# Patient Record
Sex: FEMALE | Race: WHITE | NOT HISPANIC OR LATINO | URBAN - METROPOLITAN AREA
[De-identification: names, ages, dates, MRNs, and addresses within clinical notes are randomized per-mention and may not be internally consistent; named-entity substitution may affect disease eponyms.]

---

## 2021-09-04 ENCOUNTER — OUT OF OFFICE VISIT (OUTPATIENT)
Dept: URBAN - METROPOLITAN AREA MEDICAL CENTER 34 | Facility: MEDICAL CENTER | Age: 71
End: 2021-09-04
Payer: MEDICARE

## 2021-09-04 DIAGNOSIS — U07.1 COVID: ICD-10-CM

## 2021-09-04 DIAGNOSIS — K52.89 OTHER AND UNSPECIFIED NONINFECTIOUS GASTROENTERITIS AND COLITIS: ICD-10-CM

## 2021-09-04 DIAGNOSIS — R93.3 ABN FINDINGS-GI TRACT: ICD-10-CM

## 2021-09-04 DIAGNOSIS — K62.5 ANAL BLEEDING: ICD-10-CM

## 2021-09-04 PROCEDURE — 99214 OFFICE O/P EST MOD 30 MIN: CPT | Performed by: INTERNAL MEDICINE

## 2021-09-04 PROCEDURE — 45380 COLONOSCOPY AND BIOPSY: CPT | Performed by: INTERNAL MEDICINE

## 2023-02-01 ENCOUNTER — TELEPHONE ENCOUNTER (OUTPATIENT)
Dept: URBAN - METROPOLITAN AREA CLINIC 94 | Facility: CLINIC | Age: 73
End: 2023-02-01

## 2023-02-07 ENCOUNTER — WEB ENCOUNTER (OUTPATIENT)
Dept: URBAN - METROPOLITAN AREA CLINIC 94 | Facility: CLINIC | Age: 73
End: 2023-02-07

## 2023-02-07 ENCOUNTER — OFFICE VISIT (OUTPATIENT)
Dept: URBAN - METROPOLITAN AREA CLINIC 94 | Facility: CLINIC | Age: 73
End: 2023-02-07
Payer: MEDICARE

## 2023-02-07 VITALS
SYSTOLIC BLOOD PRESSURE: 125 MMHG | BODY MASS INDEX: 24.54 KG/M2 | TEMPERATURE: 97.3 F | WEIGHT: 125 LBS | HEIGHT: 60 IN | HEART RATE: 90 BPM | DIASTOLIC BLOOD PRESSURE: 83 MMHG

## 2023-02-07 DIAGNOSIS — R19.7 DIARRHEA, UNSPECIFIED TYPE: ICD-10-CM

## 2023-02-07 PROCEDURE — 99214 OFFICE O/P EST MOD 30 MIN: CPT | Performed by: PHYSICIAN ASSISTANT

## 2023-02-07 RX ORDER — LAMOTRIGINE 200 MG/1
250 MG TABLET ORAL ONCE A DAY
Status: ACTIVE | COMMUNITY

## 2023-02-07 NOTE — HPI-TODAY'S VISIT:
73 yo F evaluated today for acute onset diarrhea.   Pt reports on January 6th - tx for UTI at [facility] ER - Given Rocephin followed by Cipro  Shortly afterwards - bowel habits changed with loose to soft stools with mucus.  Pt did report seeing BR blood- TP only  last Monday.  Denies fever. Sx have since improved and this morning, was the first BM that appeared normal. Pt also reports taking probiotics and yogurt.   Last CT 9/2022 - Findings most consistent with nonspecific enterocolitis.  Colonoscopy 2021 - DR Westbrook - Colitis, in descending colon, nonspecific, mild to moderate. Internal hemorrhoids. Minimal diverticulosis. Bx Colonic mucosa with fibrosis, intramucosal hemorrhage, focal active colitis, and crypt atrophy, see comment. The constellation of findings is most compatible with ischemic colitis; however, the differential diagnosis includes acute self-limited colitis, infection, and inflammatory bowel disease.  Colonoscopy 2020 DR Johnson -sigmoid colitis-ischemic versus self-limiting versus inflammatory bowel.  Diverticulosis, internal hemorrhoids.

## 2023-02-08 LAB
ABSOLUTE BASOPHILS: 41
ABSOLUTE EOSINOPHILS: 82
ABSOLUTE LYMPHOCYTES: 1190
ABSOLUTE MONOCYTES: 619
ABSOLUTE NEUTROPHILS: 4869
BASOPHILS: 0.6
C-REACTIVE PROTEIN, QUANT: 4.1
EOSINOPHILS: 1.2
HEMATOCRIT: 37.4
HEMOGLOBIN: 12.1
LYMPHOCYTES: 17.5
MCH: 27.3
MCHC: 32.4
MCV: 84.2
MONOCYTES: 9.1
MPV: 13.2
NEUTROPHILS: 71.6
PLATELET COUNT: 252
RDW: 12.8
RED BLOOD CELL COUNT: 4.44
SED RATE BY MODIFIED: 11
WHITE BLOOD CELL COUNT: 6.8

## 2023-02-14 LAB
CALPROTECTIN, FECAL: 6
CLOSTRIDIUM DIFFICILE TOXINB,QL REAL TIME PCR: NOT DETECTED
CLOSTRIDIUM DIFFICILE: (no result)

## 2023-02-20 ENCOUNTER — OFFICE VISIT (OUTPATIENT)
Dept: URBAN - METROPOLITAN AREA CLINIC 94 | Facility: CLINIC | Age: 73
End: 2023-02-20

## 2023-02-21 ENCOUNTER — WEB ENCOUNTER (OUTPATIENT)
Dept: URBAN - METROPOLITAN AREA CLINIC 94 | Facility: CLINIC | Age: 73
End: 2023-02-21

## 2023-02-21 ENCOUNTER — OFFICE VISIT (OUTPATIENT)
Dept: URBAN - METROPOLITAN AREA CLINIC 94 | Facility: CLINIC | Age: 73
End: 2023-02-21
Payer: MEDICARE

## 2023-02-21 VITALS
TEMPERATURE: 97.2 F | WEIGHT: 125 LBS | DIASTOLIC BLOOD PRESSURE: 67 MMHG | BODY MASS INDEX: 24.54 KG/M2 | HEART RATE: 100 BPM | HEIGHT: 60 IN | SYSTOLIC BLOOD PRESSURE: 140 MMHG

## 2023-02-21 DIAGNOSIS — Z87.19 HISTORY OF COLITIS: ICD-10-CM

## 2023-02-21 DIAGNOSIS — R19.7 DIARRHEA, UNSPECIFIED TYPE: ICD-10-CM

## 2023-02-21 PROCEDURE — 99213 OFFICE O/P EST LOW 20 MIN: CPT | Performed by: PHYSICIAN ASSISTANT

## 2023-02-21 RX ORDER — LAMOTRIGINE 200 MG/1
250 MG TABLET ORAL ONCE A DAY
Status: ACTIVE | COMMUNITY

## 2023-02-21 NOTE — HPI-TODAY'S VISIT:
71 yo F evaluated today for recent hx of diarrhea.   Since her last visit, diarrhea has completely resolved. She denies GI sx today of abdominal pain, diarrhea, or  bloody stools. Pt had stool studies and labs (see below) all negative   Pt previously reported on January 6th - tx for UTI at [facility] ER - Given Rocephin followed by Cipro  Shortly afterwards - bowel habits changed with loose to soft stools with mucus.  Pt did report seeing BR blood- TP only  last Monday.  Denies fever. Sx have since improved and this morning, was the first BM that appeared normal. Pt also reports taking probiotics and yogurt.   Last CT 9/2022 - Findings most consistent with nonspecific enterocolitis.  Colonoscopy 2021 - DR Westbrook - Colitis, in descending colon, nonspecific, mild to moderate. Internal hemorrhoids. Minimal diverticulosis. Bx Colonic mucosa with fibrosis, intramucosal hemorrhage, focal active colitis, and crypt atrophy, see comment. The constellation of findings is most compatible with ischemic colitis; however, the differential diagnosis includes acute self-limited colitis, infection, and inflammatory bowel disease.  Colonoscopy 2020 DR Johnson -sigmoid colitis-ischemic versus self-limiting versus inflammatory bowel.  Diverticulosis, internal hemorrhoids.

## 2023-08-22 ENCOUNTER — OFFICE VISIT (OUTPATIENT)
Dept: URBAN - METROPOLITAN AREA CLINIC 94 | Facility: CLINIC | Age: 73
End: 2023-08-22

## 2023-11-29 ENCOUNTER — TELEPHONE ENCOUNTER (OUTPATIENT)
Dept: URBAN - METROPOLITAN AREA CLINIC 94 | Facility: CLINIC | Age: 73
End: 2023-11-29

## 2023-12-01 ENCOUNTER — OFFICE VISIT (OUTPATIENT)
Dept: URBAN - METROPOLITAN AREA CLINIC 94 | Facility: CLINIC | Age: 73
End: 2023-12-01
Payer: MEDICARE

## 2023-12-01 ENCOUNTER — LAB OUTSIDE AN ENCOUNTER (OUTPATIENT)
Dept: URBAN - METROPOLITAN AREA CLINIC 94 | Facility: CLINIC | Age: 73
End: 2023-12-01

## 2023-12-01 VITALS
WEIGHT: 126 LBS | HEIGHT: 60 IN | HEART RATE: 76 BPM | TEMPERATURE: 97.3 F | DIASTOLIC BLOOD PRESSURE: 66 MMHG | BODY MASS INDEX: 24.74 KG/M2 | SYSTOLIC BLOOD PRESSURE: 119 MMHG

## 2023-12-01 DIAGNOSIS — Z87.19 HISTORY OF COLITIS: ICD-10-CM

## 2023-12-01 DIAGNOSIS — R10.10 PAIN OF UPPER ABDOMEN: ICD-10-CM

## 2023-12-01 DIAGNOSIS — R19.7 DIARRHEA, UNSPECIFIED TYPE: ICD-10-CM

## 2023-12-01 PROCEDURE — 99214 OFFICE O/P EST MOD 30 MIN: CPT | Performed by: PHYSICIAN ASSISTANT

## 2023-12-01 RX ORDER — LAMOTRIGINE 200 MG/1
250 MG TABLET ORAL ONCE A DAY
Status: ACTIVE | COMMUNITY

## 2023-12-01 NOTE — HPI-TODAY'S VISIT:
72 yo F evaluated today for recent hx of diarrhea.   Patient evaluated today for return of diarrhea. She reports on Monday, having abdominal distention with episode of urgent non bloody loose stool on Tuesday. Pt reports prior to this noticing softer stools but without diarrhea and no change in frequency. She reports having a piece of cake the morning of. Denies N/V. Since diarrhea episode, she reports softer stools.   Of note, patient has been tx with 3 rounds of Abx for UTI   Last CT 9/2022 - Findings most consistent with nonspecific enterocolitis.  Colonoscopy 2021 - DR Westbrook - Colitis, in descending colon, nonspecific, mild to moderate. Internal hemorrhoids. Minimal diverticulosis. Bx Colonic mucosa with fibrosis, intramucosal hemorrhage, focal active colitis, and crypt atrophy, see comment. The constellation of findings is most compatible with ischemic colitis; however, the differential diagnosis includes acute self-limited colitis, infection, and inflammatory bowel disease.  Colonoscopy 2020 DR Johnson -sigmoid colitis-ischemic versus self-limiting versus inflammatory bowel.  Diverticulosis, internal hemorrhoids.

## 2023-12-04 LAB
ABSOLUTE BASOPHILS: 59
ABSOLUTE EOSINOPHILS: 92
ABSOLUTE LYMPHOCYTES: 1447
ABSOLUTE MONOCYTES: 443
ABSOLUTE NEUTROPHILS: 3359
BASOPHILS: 1.1
C-REACTIVE PROTEIN, QUANT: 7.1
EOSINOPHILS: 1.7
HEMATOCRIT: 36.4
HEMOGLOBIN: 12.1
IMMUNOGLOBULIN A, QN, SERUM: 231
LYMPHOCYTES: 26.8
MCH: 27.9
MCHC: 33.2
MCV: 84.1
MONOCYTES: 8.2
MPV: 13.5
NEUTROPHILS: 62.2
PLATELET COUNT: 297
RDW: 13.1
RED BLOOD CELL COUNT: 4.33
T-TRANSGLUTAMINASE (TTG) IGA: <1
WHITE BLOOD CELL COUNT: 5.4

## 2023-12-13 ENCOUNTER — OFFICE VISIT (OUTPATIENT)
Dept: URBAN - METROPOLITAN AREA CLINIC 94 | Facility: CLINIC | Age: 73
End: 2023-12-13
Payer: MEDICARE

## 2023-12-13 ENCOUNTER — DASHBOARD ENCOUNTERS (OUTPATIENT)
Age: 73
End: 2023-12-13

## 2023-12-13 VITALS
HEIGHT: 60 IN | WEIGHT: 127 LBS | TEMPERATURE: 97.2 F | RESPIRATION RATE: 96 BRPM | BODY MASS INDEX: 24.94 KG/M2 | DIASTOLIC BLOOD PRESSURE: 68 MMHG | SYSTOLIC BLOOD PRESSURE: 136 MMHG | OXYGEN SATURATION: 92 %

## 2023-12-13 DIAGNOSIS — R10.10 PAIN OF UPPER ABDOMEN: ICD-10-CM

## 2023-12-13 DIAGNOSIS — Z87.19 HISTORY OF COLITIS: ICD-10-CM

## 2023-12-13 DIAGNOSIS — R19.7 DIARRHEA, UNSPECIFIED TYPE: ICD-10-CM

## 2023-12-13 PROCEDURE — 99213 OFFICE O/P EST LOW 20 MIN: CPT | Performed by: PHYSICIAN ASSISTANT

## 2023-12-13 RX ORDER — LAMOTRIGINE 200 MG/1
250 MG TABLET ORAL ONCE A DAY
Status: ACTIVE | COMMUNITY

## 2023-12-13 NOTE — HPI-TODAY'S VISIT:
72 yo F evaluated today for recent hx of diarrhea.   Pt denies any having diarrhea again since last visit. Pt reports some loose stool with seepage. Denies melena or hematochezia. Patient report abdominal distention/pain improved. Labs - CBC, CRP and celiac panel negative. Stool studies are pending.  CT also ordered but patient has not scheduled.   Previously evaluated due to a return of diarrhea. She reports on Monday, having abdominal distention with episode of urgent non bloody loose stool on Tuesday. Pt reports prior to this noticing softer stools but without diarrhea and no change in frequency. She reports having a piece of cake the morning of. Denies N/V. Since diarrhea episode, she reports softer stools.   Of note, patient has been tx with 3 rounds of Abx for UTI   Last CT 9/2022 - Findings most consistent with nonspecific enterocolitis.  Colonoscopy 2021 - DR Westbrook - Colitis, in descending colon, nonspecific, mild to moderate. Internal hemorrhoids. Minimal diverticulosis. Bx Colonic mucosa with fibrosis, intramucosal hemorrhage, focal active colitis, and crypt atrophy, see comment. The constellation of findings is most compatible with ischemic colitis; however, the differential diagnosis includes acute self-limited colitis, infection, and inflammatory bowel disease.  Colonoscopy 2020 DR Johnson -sigmoid colitis-ischemic versus self-limiting versus inflammatory bowel.  Diverticulosis, internal hemorrhoids.

## 2023-12-18 LAB
CALPROTECTIN, FECAL: 21
CAMPYLOBACTER GROUP: NOT DETECTED
CLOSTRIDIUM DIFFICILE: (no result)
NOROVIRUS GI/GII: NOT DETECTED
PANCREATIC ELASTASE, FECAL: >500
ROTAVIRUS A: NOT DETECTED
SALMONELLA SPECIES: NOT DETECTED
SHIGA TOXIN 1: NOT DETECTED
SHIGA TOXIN 2: NOT DETECTED
SHIGELLA SPECIES: NOT DETECTED
VIBRIO GROUP: NOT DETECTED
YERSINIA ENTEROCOLITICA: NOT DETECTED